# Patient Record
Sex: FEMALE | Race: WHITE | NOT HISPANIC OR LATINO | URBAN - METROPOLITAN AREA
[De-identification: names, ages, dates, MRNs, and addresses within clinical notes are randomized per-mention and may not be internally consistent; named-entity substitution may affect disease eponyms.]

---

## 2017-08-21 ENCOUNTER — INPATIENT (INPATIENT)
Facility: HOSPITAL | Age: 72
LOS: 0 days | Discharge: HOME | DRG: 343 | End: 2017-08-22
Attending: SURGERY | Admitting: SURGERY
Payer: COMMERCIAL

## 2017-08-21 VITALS
DIASTOLIC BLOOD PRESSURE: 76 MMHG | RESPIRATION RATE: 18 BRPM | OXYGEN SATURATION: 100 % | WEIGHT: 145.06 LBS | SYSTOLIC BLOOD PRESSURE: 157 MMHG | HEART RATE: 74 BPM | TEMPERATURE: 98 F

## 2017-08-21 DIAGNOSIS — E89.0 POSTPROCEDURAL HYPOTHYROIDISM: Chronic | ICD-10-CM

## 2017-08-21 DIAGNOSIS — Z90.49 ACQUIRED ABSENCE OF OTHER SPECIFIED PARTS OF DIGESTIVE TRACT: Chronic | ICD-10-CM

## 2017-08-21 LAB
ALBUMIN SERPL ELPH-MCNC: 4.8 G/DL — SIGNIFICANT CHANGE UP (ref 3.3–5)
ALP SERPL-CCNC: 102 U/L — SIGNIFICANT CHANGE UP (ref 40–120)
ALT FLD-CCNC: 26 U/L — SIGNIFICANT CHANGE UP (ref 10–45)
ANION GAP SERPL CALC-SCNC: 21 MMOL/L — HIGH (ref 5–17)
APPEARANCE UR: CLEAR — SIGNIFICANT CHANGE UP
AST SERPL-CCNC: 28 U/L — SIGNIFICANT CHANGE UP (ref 10–40)
BACTERIA # UR AUTO: PRESENT /HPF
BASOPHILS NFR BLD AUTO: 0.1 % — SIGNIFICANT CHANGE UP (ref 0–2)
BILIRUB SERPL-MCNC: 1.5 MG/DL — HIGH (ref 0.2–1.2)
BILIRUB UR-MCNC: NEGATIVE — SIGNIFICANT CHANGE UP
BUN SERPL-MCNC: 17 MG/DL — SIGNIFICANT CHANGE UP (ref 7–23)
CALCIUM SERPL-MCNC: 10 MG/DL — SIGNIFICANT CHANGE UP (ref 8.4–10.5)
CHLORIDE SERPL-SCNC: 97 MMOL/L — SIGNIFICANT CHANGE UP (ref 96–108)
CO2 SERPL-SCNC: 20 MMOL/L — LOW (ref 22–31)
COLOR SPEC: YELLOW — SIGNIFICANT CHANGE UP
CREAT SERPL-MCNC: 1 MG/DL — SIGNIFICANT CHANGE UP (ref 0.5–1.3)
DIFF PNL FLD: NEGATIVE — SIGNIFICANT CHANGE UP
EOSINOPHIL NFR BLD AUTO: 0.1 % — SIGNIFICANT CHANGE UP (ref 0–6)
EPI CELLS # UR: SIGNIFICANT CHANGE UP /HPF
GLUCOSE SERPL-MCNC: 119 MG/DL — HIGH (ref 70–99)
GLUCOSE UR QL: NEGATIVE — SIGNIFICANT CHANGE UP
HCT VFR BLD CALC: 42 % — SIGNIFICANT CHANGE UP (ref 34.5–45)
HGB BLD-MCNC: 14.8 G/DL — SIGNIFICANT CHANGE UP (ref 11.5–15.5)
HYALINE CASTS # UR AUTO: (no result) /LPF
KETONES UR-MCNC: 40 MG/DL
LACTATE SERPL-SCNC: 3.7 MMOL/L — HIGH (ref 0.5–2)
LACTATE SERPL-SCNC: 4.8 MMOL/L — CRITICAL HIGH (ref 0.5–2)
LEUKOCYTE ESTERASE UR-ACNC: NEGATIVE — SIGNIFICANT CHANGE UP
LIDOCAIN IGE QN: 30 U/L — SIGNIFICANT CHANGE UP (ref 7–60)
LYMPHOCYTES # BLD AUTO: 5.6 % — LOW (ref 13–44)
MCHC RBC-ENTMCNC: 31.1 PG — SIGNIFICANT CHANGE UP (ref 27–34)
MCHC RBC-ENTMCNC: 35.2 G/DL — SIGNIFICANT CHANGE UP (ref 32–36)
MCV RBC AUTO: 88.2 FL — SIGNIFICANT CHANGE UP (ref 80–100)
MONOCYTES NFR BLD AUTO: 5.8 % — SIGNIFICANT CHANGE UP (ref 2–14)
NEUTROPHILS NFR BLD AUTO: 88.4 % — HIGH (ref 43–77)
NITRITE UR-MCNC: NEGATIVE — SIGNIFICANT CHANGE UP
PH UR: 8.5 — HIGH (ref 5–8)
PLATELET # BLD AUTO: 215 K/UL — SIGNIFICANT CHANGE UP (ref 150–400)
POTASSIUM SERPL-MCNC: 4.2 MMOL/L — SIGNIFICANT CHANGE UP (ref 3.5–5.3)
POTASSIUM SERPL-SCNC: 4.2 MMOL/L — SIGNIFICANT CHANGE UP (ref 3.5–5.3)
PROT SERPL-MCNC: 8.5 G/DL — HIGH (ref 6–8.3)
PROT UR-MCNC: (no result) MG/DL
RBC # BLD: 4.76 M/UL — SIGNIFICANT CHANGE UP (ref 3.8–5.2)
RBC # FLD: 12.8 % — SIGNIFICANT CHANGE UP (ref 10.3–16.9)
RBC CASTS # UR COMP ASSIST: < 5 /HPF — SIGNIFICANT CHANGE UP
SODIUM SERPL-SCNC: 138 MMOL/L — SIGNIFICANT CHANGE UP (ref 135–145)
SP GR SPEC: 1.01 — SIGNIFICANT CHANGE UP (ref 1–1.03)
UROBILINOGEN FLD QL: 0.2 E.U./DL — SIGNIFICANT CHANGE UP
WBC # BLD: 14.4 K/UL — HIGH (ref 3.8–10.5)
WBC # FLD AUTO: 14.4 K/UL — HIGH (ref 3.8–10.5)
WBC UR QL: < 5 /HPF — SIGNIFICANT CHANGE UP

## 2017-08-21 PROCEDURE — 93010 ELECTROCARDIOGRAM REPORT: CPT | Mod: 77

## 2017-08-21 PROCEDURE — 99285 EMERGENCY DEPT VISIT HI MDM: CPT | Mod: 25

## 2017-08-21 PROCEDURE — 71275 CT ANGIOGRAPHY CHEST: CPT | Mod: 26

## 2017-08-21 PROCEDURE — 74010: CPT | Mod: 26

## 2017-08-21 PROCEDURE — 93010 ELECTROCARDIOGRAM REPORT: CPT

## 2017-08-21 PROCEDURE — 74020: CPT | Mod: 26

## 2017-08-21 PROCEDURE — 74174 CTA ABD&PLVS W/CONTRAST: CPT | Mod: 26

## 2017-08-21 RX ORDER — FAMOTIDINE 10 MG/ML
20 INJECTION INTRAVENOUS ONCE
Qty: 0 | Refills: 0 | Status: COMPLETED | OUTPATIENT
Start: 2017-08-21 | End: 2017-08-21

## 2017-08-21 RX ORDER — OXYCODONE AND ACETAMINOPHEN 5; 325 MG/1; MG/1
2 TABLET ORAL EVERY 4 HOURS
Qty: 0 | Refills: 0 | Status: DISCONTINUED | OUTPATIENT
Start: 2017-08-21 | End: 2017-08-22

## 2017-08-21 RX ORDER — SODIUM CHLORIDE 9 MG/ML
1000 INJECTION INTRAMUSCULAR; INTRAVENOUS; SUBCUTANEOUS
Qty: 0 | Refills: 0 | Status: DISCONTINUED | OUTPATIENT
Start: 2017-08-21 | End: 2017-08-21

## 2017-08-21 RX ORDER — LEVOTHYROXINE SODIUM 125 MCG
44 TABLET ORAL DAILY
Qty: 0 | Refills: 0 | Status: CANCELLED | OUTPATIENT
Start: 2017-08-22 | End: 2017-08-21

## 2017-08-21 RX ORDER — LEVOTHYROXINE SODIUM 125 MCG
44 TABLET ORAL DAILY
Qty: 0 | Refills: 0 | Status: DISCONTINUED | OUTPATIENT
Start: 2017-08-21 | End: 2017-08-21

## 2017-08-21 RX ORDER — LEVOTHYROXINE SODIUM 125 MCG
50 TABLET ORAL DAILY
Qty: 0 | Refills: 0 | Status: DISCONTINUED | OUTPATIENT
Start: 2017-08-21 | End: 2017-08-22

## 2017-08-21 RX ORDER — SODIUM CHLORIDE 9 MG/ML
1000 INJECTION INTRAMUSCULAR; INTRAVENOUS; SUBCUTANEOUS ONCE
Qty: 0 | Refills: 0 | Status: COMPLETED | OUTPATIENT
Start: 2017-08-21 | End: 2017-08-21

## 2017-08-21 RX ORDER — HYDROMORPHONE HYDROCHLORIDE 2 MG/ML
1 INJECTION INTRAMUSCULAR; INTRAVENOUS; SUBCUTANEOUS ONCE
Qty: 0 | Refills: 0 | Status: DISCONTINUED | OUTPATIENT
Start: 2017-08-21 | End: 2017-08-21

## 2017-08-21 RX ORDER — ONDANSETRON 8 MG/1
4 TABLET, FILM COATED ORAL ONCE
Qty: 0 | Refills: 0 | Status: COMPLETED | OUTPATIENT
Start: 2017-08-21 | End: 2017-08-21

## 2017-08-21 RX ORDER — AMLODIPINE BESYLATE 2.5 MG/1
5 TABLET ORAL DAILY
Qty: 0 | Refills: 0 | Status: DISCONTINUED | OUTPATIENT
Start: 2017-08-21 | End: 2017-08-22

## 2017-08-21 RX ORDER — METRONIDAZOLE 500 MG
500 TABLET ORAL ONCE
Qty: 0 | Refills: 0 | Status: COMPLETED | OUTPATIENT
Start: 2017-08-21 | End: 2017-08-21

## 2017-08-21 RX ORDER — HEPARIN SODIUM 5000 [USP'U]/ML
5000 INJECTION INTRAVENOUS; SUBCUTANEOUS EVERY 8 HOURS
Qty: 0 | Refills: 0 | Status: DISCONTINUED | OUTPATIENT
Start: 2017-08-21 | End: 2017-08-21

## 2017-08-21 RX ORDER — HEPARIN SODIUM 5000 [USP'U]/ML
5000 INJECTION INTRAVENOUS; SUBCUTANEOUS EVERY 12 HOURS
Qty: 0 | Refills: 0 | Status: DISCONTINUED | OUTPATIENT
Start: 2017-08-22 | End: 2017-08-22

## 2017-08-21 RX ORDER — METRONIDAZOLE 500 MG
500 TABLET ORAL EVERY 8 HOURS
Qty: 0 | Refills: 0 | Status: DISCONTINUED | OUTPATIENT
Start: 2017-08-21 | End: 2017-08-21

## 2017-08-21 RX ORDER — AMLODIPINE BESYLATE 2.5 MG/1
5 TABLET ORAL DAILY
Qty: 0 | Refills: 0 | Status: CANCELLED | OUTPATIENT
Start: 2017-08-22 | End: 2017-08-21

## 2017-08-21 RX ORDER — CIPROFLOXACIN LACTATE 400MG/40ML
400 VIAL (ML) INTRAVENOUS EVERY 12 HOURS
Qty: 0 | Refills: 0 | Status: DISCONTINUED | OUTPATIENT
Start: 2017-08-21 | End: 2017-08-21

## 2017-08-21 RX ORDER — ONDANSETRON 8 MG/1
4 TABLET, FILM COATED ORAL EVERY 6 HOURS
Qty: 0 | Refills: 0 | Status: DISCONTINUED | OUTPATIENT
Start: 2017-08-21 | End: 2017-08-21

## 2017-08-21 RX ORDER — SODIUM CHLORIDE 9 MG/ML
1000 INJECTION, SOLUTION INTRAVENOUS
Qty: 0 | Refills: 0 | Status: DISCONTINUED | OUTPATIENT
Start: 2017-08-21 | End: 2017-08-22

## 2017-08-21 RX ORDER — HEPARIN SODIUM 5000 [USP'U]/ML
5000 INJECTION INTRAVENOUS; SUBCUTANEOUS EVERY 12 HOURS
Qty: 0 | Refills: 0 | Status: DISCONTINUED | OUTPATIENT
Start: 2017-08-21 | End: 2017-08-21

## 2017-08-21 RX ORDER — HYDROMORPHONE HYDROCHLORIDE 2 MG/ML
1 INJECTION INTRAMUSCULAR; INTRAVENOUS; SUBCUTANEOUS EVERY 4 HOURS
Qty: 0 | Refills: 0 | Status: DISCONTINUED | OUTPATIENT
Start: 2017-08-21 | End: 2017-08-21

## 2017-08-21 RX ORDER — MORPHINE SULFATE 50 MG/1
4 CAPSULE, EXTENDED RELEASE ORAL ONCE
Qty: 0 | Refills: 0 | Status: DISCONTINUED | OUTPATIENT
Start: 2017-08-21 | End: 2017-08-21

## 2017-08-21 RX ORDER — HYDROMORPHONE HYDROCHLORIDE 2 MG/ML
0.5 INJECTION INTRAMUSCULAR; INTRAVENOUS; SUBCUTANEOUS EVERY 4 HOURS
Qty: 0 | Refills: 0 | Status: DISCONTINUED | OUTPATIENT
Start: 2017-08-21 | End: 2017-08-21

## 2017-08-21 RX ORDER — LEVOTHYROXINE SODIUM 125 MCG
176 TABLET ORAL DAILY
Qty: 0 | Refills: 0 | Status: DISCONTINUED | OUTPATIENT
Start: 2017-08-21 | End: 2017-08-21

## 2017-08-21 RX ORDER — ONDANSETRON 8 MG/1
4 TABLET, FILM COATED ORAL ONCE
Qty: 0 | Refills: 0 | Status: DISCONTINUED | OUTPATIENT
Start: 2017-08-21 | End: 2017-08-22

## 2017-08-21 RX ADMIN — ONDANSETRON 4 MILLIGRAM(S): 8 TABLET, FILM COATED ORAL at 18:04

## 2017-08-21 RX ADMIN — MORPHINE SULFATE 4 MILLIGRAM(S): 50 CAPSULE, EXTENDED RELEASE ORAL at 18:35

## 2017-08-21 RX ADMIN — HYDROMORPHONE HYDROCHLORIDE 1 MILLIGRAM(S): 2 INJECTION INTRAMUSCULAR; INTRAVENOUS; SUBCUTANEOUS at 19:28

## 2017-08-21 RX ADMIN — MORPHINE SULFATE 4 MILLIGRAM(S): 50 CAPSULE, EXTENDED RELEASE ORAL at 18:05

## 2017-08-21 RX ADMIN — SODIUM CHLORIDE 125 MILLILITER(S): 9 INJECTION INTRAMUSCULAR; INTRAVENOUS; SUBCUTANEOUS at 14:48

## 2017-08-21 RX ADMIN — ONDANSETRON 4 MILLIGRAM(S): 8 TABLET, FILM COATED ORAL at 15:17

## 2017-08-21 RX ADMIN — FAMOTIDINE 20 MILLIGRAM(S): 10 INJECTION INTRAVENOUS at 15:17

## 2017-08-21 RX ADMIN — SODIUM CHLORIDE 1000 MILLILITER(S): 9 INJECTION INTRAMUSCULAR; INTRAVENOUS; SUBCUTANEOUS at 18:35

## 2017-08-21 RX ADMIN — Medication 200 MILLIGRAM(S): at 17:43

## 2017-08-21 RX ADMIN — SODIUM CHLORIDE 1000 MILLILITER(S): 9 INJECTION INTRAMUSCULAR; INTRAVENOUS; SUBCUTANEOUS at 16:08

## 2017-08-21 RX ADMIN — HYDROMORPHONE HYDROCHLORIDE 1 MILLIGRAM(S): 2 INJECTION INTRAMUSCULAR; INTRAVENOUS; SUBCUTANEOUS at 19:27

## 2017-08-21 RX ADMIN — Medication 100 MILLIGRAM(S): at 16:08

## 2017-08-21 NOTE — H&P ADULT - NSHPPHYSICALEXAM_GEN_ALL_CORE
Vital Signs Last 24 Hrs  T(C): 36.7 (21 Aug 2017 18:08), Max: 36.9 (21 Aug 2017 13:38)  T(F): 98.1 (21 Aug 2017 18:08), Max: 98.4 (21 Aug 2017 13:38)  HR: 71 (21 Aug 2017 18:08) (60 - 74)  BP: 153/67 (21 Aug 2017 18:08) (153/67 - 188/79)  BP(mean): 50 (21 Aug 2017 15:54) (50 - 50)  RR: 18 (21 Aug 2017 18:08) (18 - 18)  SpO2: 100% (21 Aug 2017 18:08) (100% - 100%)    Gen: Age appropriate female in NAD  CV: RRR  Resp: CTAB  Abd: +BS, Soft, TTP RLQ, Neg Rosvig's sign, ND, No r/g/r  Ext: WWP, no peripheral edema

## 2017-08-21 NOTE — H&P ADULT - ASSESSMENT
71F Hx of HTN, HLD, Hypothyroid, Depression here with an acute appendicitis.  - Plan to admit to surgery, tele bed  - NPO/IVF  - IV cipro/flagyl (PCN allergy)  - Will take pt to OR for laparoscopic appendectomy  - SQH/SCDs/OOBA  - Pain/nausea control PRN  - Home amiloride, amlodipine, and synthroid will hold remaining home meds  - Plan discussed with Chief and Dr. Reed 71F Hx of HTN, HLD, Hypothyroid, Depression here with an acute appendicitis.  - Plan to admit to surgery, tele bed  - NPO/IVF  - IV cipro/flagyl (PCN allergy)  - Will take pt to OR for laparoscopic appendectomy  - SQH/SCDs/OOBA  - Pain/nausea control PRN  - Home amlodipine, and synthroid will hold remaining home meds  - Plan discussed with Chief and Dr. Reed

## 2017-08-21 NOTE — BRIEF OPERATIVE NOTE - OPERATION/FINDINGS
Retrocecal appendix, nonperforated, nongangrenous  Mesoappendix ligated with harmonic scalpel  Appendix ligated with endoloop x 2

## 2017-08-21 NOTE — H&P ADULT - NSHPLABSRESULTS_GEN_ALL_CORE
14.8   14.4  )-----------( 215      ( 21 Aug 2017 14:58 )             42.0   08-    138  |  97  |  17  ----------------------------<  119<H>  4.2   |  20<L>  |  1.00    Ca    10.0      21 Aug 2017 14:56    TPro  8.5<H>  /  Alb  4.8  /  TBili  1.5<H>  /  DBili  x   /  AST  28  /  ALT  26  /  AlkPhos  102      lactate: 4.8 --> 3.7    Urinalysis Basic - ( 21 Aug 2017 15:54 )    Color: Yellow / Appearance: Clear / S.010 / pH: x  Gluc: x / Ketone: 40 mg/dL  / Bili: NEGATIVE / Urobili: 0.2 E.U./dL   Blood: x / Protein: Trace mg/dL / Nitrite: NEGATIVE   Leuk Esterase: NEGATIVE / RBC: < 5 /HPF / WBC < 5 /HPF   Sq Epi: x / Non Sq Epi: Few /HPF / Bacteria: Present /HPF

## 2017-08-21 NOTE — H&P ADULT - ATTENDING COMMENTS
Pain since this morning.  Non-tender now  CT reviewed.    A/P: Acute appendicitis.   1. OR for lap/open appendectomy. Risks of bleeding, infection, organ injury reviewed. She agrees to proceed.

## 2017-08-21 NOTE — ED ADULT TRIAGE NOTE - CHIEF COMPLAINT QUOTE
abdominal pain and nausea since 8 AM normal BM today. patient is pale and lightheaded. denies fever, chills. went to [facility] and and was sent to ED

## 2017-08-21 NOTE — ED PROVIDER NOTE - MEDICAL DECISION MAKING DETAILS
abd pain elev  wbc and lactate  no fever initially  no acute ekg changes  c/o of increase pain in lower quadrants and lower abd   surgery called  will repeat ekg and trop  CTA  to eval for ischemia

## 2017-08-21 NOTE — ED PROVIDER NOTE - OBJECTIVE STATEMENT
72 yo female s/p lap choly 3 years ago  hx of HTN  nl stress test 1 year ago-- with upper abd pain nausea - no vomiting  x 6 hrs- sent from [facility] for eval-- no diarrhea nl stool this am  no fevers or chills no cough or sob or cp  no flank tenderness or dysuria- no prior

## 2017-08-21 NOTE — ED ADULT NURSE NOTE - OBJECTIVE STATEMENT
intermittent, nonradiating sharp lower abdominal pain since 8 am today.  Last BM this morning.  Denies nausea, blood in stool, vomiting, chest pain, recent abd sx,  symptoms

## 2017-08-21 NOTE — H&P ADULT - HISTORY OF PRESENT ILLNESS
71F with HTN, HLD, Hypothyroid, Depression, Anxiety presenting to the ED with a 1 day hx of periumbilical abdominal pain.... 71F with HTN, HLD, Hypothyroid, Depression, Anxiety presenting to the ED with a 1 day hx of periumbilical abdominal pain....    Colonscopy in september of 2016 that was reported as normal.    All: PCN and sulfa 71F with HTN, HLD, Hypothyroid, Depression, Anxiety presenting to the ED with a 1 day hx of periumbilical abdominal pain that was crampy in nature.  She denies any migration of her pain. She currently denies any F/C/N/V/D/Bloody BM/CP/SOB/Dyspnea/Dysuria. Colonscopy in september of 2016 that was reported as normal.    All: PCN and sulfa  PSH: laparoscopic cholecystectomy, subtotal thyroidectomy  Social: Never smoker

## 2017-08-21 NOTE — ED PROVIDER NOTE - ABDOMINAL EXAM
tender.../epigastric  no RLq tenderness   no CVAT tender.../epigastric  mild RLQ/ periumbilical tenderness--   no CVAT

## 2017-08-21 NOTE — ED ADULT NURSE NOTE - CHPI ED SYMPTOMS NEG
no hematuria/no chills/no vomiting/no burning urination/no dysuria/no nausea/no abdominal distension/no diarrhea/no fever/no blood in stool

## 2017-08-22 VITALS
TEMPERATURE: 99 F | HEART RATE: 66 BPM | DIASTOLIC BLOOD PRESSURE: 70 MMHG | RESPIRATION RATE: 16 BRPM | OXYGEN SATURATION: 96 % | SYSTOLIC BLOOD PRESSURE: 127 MMHG

## 2017-08-22 LAB
CULTURE RESULTS: SIGNIFICANT CHANGE UP
SPECIMEN SOURCE: SIGNIFICANT CHANGE UP

## 2017-08-22 PROCEDURE — 87086 URINE CULTURE/COLONY COUNT: CPT

## 2017-08-22 PROCEDURE — 81001 URINALYSIS AUTO W/SCOPE: CPT

## 2017-08-22 PROCEDURE — 80053 COMPREHEN METABOLIC PANEL: CPT

## 2017-08-22 PROCEDURE — 99285 EMERGENCY DEPT VISIT HI MDM: CPT | Mod: 25

## 2017-08-22 PROCEDURE — 85025 COMPLETE CBC W/AUTO DIFF WBC: CPT

## 2017-08-22 PROCEDURE — 84484 ASSAY OF TROPONIN QUANT: CPT

## 2017-08-22 PROCEDURE — 71275 CT ANGIOGRAPHY CHEST: CPT

## 2017-08-22 PROCEDURE — 74020: CPT

## 2017-08-22 PROCEDURE — 83605 ASSAY OF LACTIC ACID: CPT

## 2017-08-22 PROCEDURE — 83690 ASSAY OF LIPASE: CPT

## 2017-08-22 PROCEDURE — 96375 TX/PRO/DX INJ NEW DRUG ADDON: CPT | Mod: XU

## 2017-08-22 PROCEDURE — 96374 THER/PROPH/DIAG INJ IV PUSH: CPT | Mod: XU

## 2017-08-22 PROCEDURE — 36415 COLL VENOUS BLD VENIPUNCTURE: CPT

## 2017-08-22 PROCEDURE — 96376 TX/PRO/DX INJ SAME DRUG ADON: CPT

## 2017-08-22 PROCEDURE — 93005 ELECTROCARDIOGRAM TRACING: CPT | Mod: 77

## 2017-08-22 PROCEDURE — 88304 TISSUE EXAM BY PATHOLOGIST: CPT

## 2017-08-22 PROCEDURE — 74174 CTA ABD&PLVS W/CONTRAST: CPT

## 2017-08-22 RX ORDER — HEPARIN SODIUM 5000 [USP'U]/ML
5000 INJECTION INTRAVENOUS; SUBCUTANEOUS EVERY 8 HOURS
Qty: 0 | Refills: 0 | Status: DISCONTINUED | OUTPATIENT
Start: 2017-08-22 | End: 2017-08-22

## 2017-08-22 RX ORDER — DOCUSATE SODIUM 100 MG
1 CAPSULE ORAL
Qty: 14 | Refills: 0 | OUTPATIENT
Start: 2017-08-22 | End: 2017-08-29

## 2017-08-22 RX ORDER — LEVOTHYROXINE SODIUM 125 MCG
100 TABLET ORAL DAILY
Qty: 0 | Refills: 0 | Status: DISCONTINUED | OUTPATIENT
Start: 2017-08-22 | End: 2017-08-22

## 2017-08-22 RX ORDER — AMILORIDE HCL 5 MG
1 TABLET ORAL
Qty: 0 | Refills: 0 | COMMUNITY

## 2017-08-22 RX ORDER — ASPIRIN/CALCIUM CARB/MAGNESIUM 324 MG
0 TABLET ORAL
Qty: 0 | Refills: 0 | COMMUNITY

## 2017-08-22 RX ORDER — ZOLPIDEM TARTRATE 10 MG/1
1 TABLET ORAL
Qty: 0 | Refills: 0 | COMMUNITY

## 2017-08-22 RX ORDER — FLUOXETINE HCL 10 MG
1 CAPSULE ORAL
Qty: 0 | Refills: 0 | COMMUNITY

## 2017-08-22 RX ORDER — AMLODIPINE BESYLATE 2.5 MG/1
1 TABLET ORAL
Qty: 0 | Refills: 0 | COMMUNITY

## 2017-08-22 RX ORDER — OXYCODONE HYDROCHLORIDE 5 MG/1
1 TABLET ORAL
Qty: 20 | Refills: 0 | OUTPATIENT
Start: 2017-08-22

## 2017-08-22 RX ORDER — LEVOTHYROXINE SODIUM 125 MCG
1 TABLET ORAL
Qty: 0 | Refills: 0 | COMMUNITY

## 2017-08-22 RX ADMIN — Medication 100 MICROGRAM(S): at 05:24

## 2017-08-22 RX ADMIN — HEPARIN SODIUM 5000 UNIT(S): 5000 INJECTION INTRAVENOUS; SUBCUTANEOUS at 05:23

## 2017-08-22 RX ADMIN — AMLODIPINE BESYLATE 5 MILLIGRAM(S): 2.5 TABLET ORAL at 05:24

## 2017-08-22 NOTE — PROGRESS NOTE ADULT - SUBJECTIVE AND OBJECTIVE BOX
O/N: Admitted with acute appendicitis, OR for lap appy. O/N: Admitted with acute appendicitis, OR for lap appy.    INTERVAL HPI/OVERNIGHT EVENTS: POC WNL    STATUS POST: Laparoscopic appendectomy     POST OPERATIVE DAY #: 1    SUBJECTIVE: Patient seen and examined by chief resident. Px complains of pain on inhalation. Px denies chest pain, SOB, nausea, diarrhea, and vomiting.      MEDICATIONS  (STANDING):  ondansetron Injectable 4 milliGRAM(s) IV Push once  amLODIPine   Tablet 5 milliGRAM(s) Oral daily  heparin  Injectable 5000 Unit(s) SubCutaneous every 8 hours  levothyroxine 100 MICROGram(s) Oral daily    MEDICATIONS  (PRN):  oxyCODONE    5 mG/acetaminophen 325 mG 2 Tablet(s) Oral every 4 hours PRN Severe Pain (7 - 10)      Vital Signs Last 24 Hrs  T(C): 36.7 (22 Aug 2017 05:22), Max: 36.9 (21 Aug 2017 13:38)  T(F): 98.1 (22 Aug 2017 05:22), Max: 98.4 (21 Aug 2017 13:38)  HR: 81 (22 Aug 2017 05:22) (60 - 92)  BP: 154/84 (22 Aug 2017 05:22) (119/68 - 188/79)  BP(mean): 50 (21 Aug 2017 15:54) (50 - 50)  RR: 16 (22 Aug 2017 05:22) (8 - 18)  SpO2: 94% (22 Aug 2017 05:22) (94% - 100%)    PHYSICAL EXAM:    Constitutional: A&Ox3    Respiratory: CTAB, non labored breathing, no respiratory distress    Cardiovascular: NSR, RRR, no murmurs, rubs, or gallops.    Gastrointestinal: RLQ pain when palpated. Pain on inhalation. Soft, ND.                 Incision: Clean, dry, and intact.     Extremities:No cyanosis, clubbing, or edema                  I&O's Detail    21 Aug 2017 07:01  -  22 Aug 2017 07:00  --------------------------------------------------------  IN:    lactated ringers.: 600 mL    sodium chloride 0.9%: 100 mL  Total IN: 700 mL    OUT:    Voided: 400 mL  Total OUT: 400 mL    Total NET: 300 mL          LABS:                        14.8   14.4  )-----------( 215      ( 21 Aug 2017 14:58 )             42.0     08-21    138  |  97  |  17  ----------------------------<  119<H>  4.2   |  20<L>  |  1.00    Ca    10.0      21 Aug 2017 14:56    TPro  8.5<H>  /  Alb  4.8  /  TBili  1.5<H>  /  DBili  x   /  AST  28  /  ALT  26  /  AlkPhos  102  -      Urinalysis Basic - ( 21 Aug 2017 15:54 )    Color: Yellow / Appearance: Clear / S.010 / pH: x  Gluc: x / Ketone: 40 mg/dL  / Bili: NEGATIVE / Urobili: 0.2 E.U./dL   Blood: x / Protein: Trace mg/dL / Nitrite: NEGATIVE   Leuk Esterase: NEGATIVE / RBC: < 5 /HPF / WBC < 5 /HPF   Sq Epi: x / Non Sq Epi: Few /HPF / Bacteria: Present /HPF        RADIOLOGY & ADDITIONAL STUDIES: O/N: Admitted with acute appendicitis, OR for lap appy.    INTERVAL HPI/OVERNIGHT EVENTS: POC WNL    STATUS POST: Laparoscopic appendectomy     POST OPERATIVE DAY #: 1    SUBJECTIVE: Patient seen and examined by chief resident. Px complains of pain on inhalation. Px denies chest pain, SOB, nausea, diarrhea, and vomiting.      MEDICATIONS  (STANDING):  ondansetron Injectable 4 milliGRAM(s) IV Push once  amLODIPine   Tablet 5 milliGRAM(s) Oral daily  heparin  Injectable 5000 Unit(s) SubCutaneous every 8 hours  levothyroxine 100 MICROGram(s) Oral daily    MEDICATIONS  (PRN):  oxyCODONE    5 mG/acetaminophen 325 mG 2 Tablet(s) Oral every 4 hours PRN Severe Pain (7 - 10)      Vital Signs Last 24 Hrs  T(C): 36.7 (22 Aug 2017 05:22), Max: 36.9 (21 Aug 2017 13:38)  T(F): 98.1 (22 Aug 2017 05:22), Max: 98.4 (21 Aug 2017 13:38)  HR: 81 (22 Aug 2017 05:22) (60 - 92)  BP: 154/84 (22 Aug 2017 05:22) (119/68 - 188/79)  BP(mean): 50 (21 Aug 2017 15:54) (50 - 50)  RR: 16 (22 Aug 2017 05:22) (8 - 18)  SpO2: 94% (22 Aug 2017 05:22) (94% - 100%)    PHYSICAL EXAM:    Constitutional: A&Ox3    Respiratory: CTAB, non labored breathing, no respiratory distress    Cardiovascular: NSR, RRR, no murmurs, rubs, or gallops.    Gastrointestinal: TTP RLQ . Pain on inhalation. Soft, ND.                 Incision: Clean, dry, and intact.     Extremities:No cyanosis, clubbing, or edema                  I&O's Detail    21 Aug 2017 07:01  -  22 Aug 2017 07:00  --------------------------------------------------------  IN:    lactated ringers.: 600 mL    sodium chloride 0.9%: 100 mL  Total IN: 700 mL    OUT:    Voided: 400 mL  Total OUT: 400 mL    Total NET: 300 mL          LABS:                        14.8   14.4  )-----------( 215      ( 21 Aug 2017 14:58 )             42.0     08-21    138  |  97  |  17  ----------------------------<  119<H>  4.2   |  20<L>  |  1.00    Ca    10.0      21 Aug 2017 14:56    TPro  8.5<H>  /  Alb  4.8  /  TBili  1.5<H>  /  DBili  x   /  AST  28  /  ALT  26  /  AlkPhos  102  -      Urinalysis Basic - ( 21 Aug 2017 15:54 )    Color: Yellow / Appearance: Clear / S.010 / pH: x  Gluc: x / Ketone: 40 mg/dL  / Bili: NEGATIVE / Urobili: 0.2 E.U./dL   Blood: x / Protein: Trace mg/dL / Nitrite: NEGATIVE   Leuk Esterase: NEGATIVE / RBC: < 5 /HPF / WBC < 5 /HPF   Sq Epi: x / Non Sq Epi: Few /HPF / Bacteria: Present /HPF        RADIOLOGY & ADDITIONAL STUDIES: O/N: Admitted with acute appendicitis, OR for lap appy.    INTERVAL HPI/OVERNIGHT EVENTS: POC WNL    STATUS POST: Laparoscopic appendectomy     POST OPERATIVE DAY #: 1    SUBJECTIVE: Patient seen and examined by chief resident. Px complains of pain on inhalation. Px denies chest pain, SOB, nausea, diarrhea, and vomiting.      MEDICATIONS  (STANDING):  ondansetron Injectable 4 milliGRAM(s) IV Push once  amLODIPine   Tablet 5 milliGRAM(s) Oral daily  heparin  Injectable 5000 Unit(s) SubCutaneous every 8 hours  levothyroxine 100 MICROGram(s) Oral daily    MEDICATIONS  (PRN):  oxyCODONE    5 mG/acetaminophen 325 mG 2 Tablet(s) Oral every 4 hours PRN Severe Pain (7 - 10)      Vital Signs Last 24 Hrs  T(C): 36.7 (22 Aug 2017 05:22), Max: 36.9 (21 Aug 2017 13:38)  T(F): 98.1 (22 Aug 2017 05:22), Max: 98.4 (21 Aug 2017 13:38)  HR: 81 (22 Aug 2017 05:22) (60 - 92)  BP: 154/84 (22 Aug 2017 05:22) (119/68 - 188/79)  BP(mean): 50 (21 Aug 2017 15:54) (50 - 50)  RR: 16 (22 Aug 2017 05:22) (8 - 18)  SpO2: 94% (22 Aug 2017 05:22) (94% - 100%)    PHYSICAL EXAM:    Constitutional: A&Ox3    Respiratory: CTAB, non labored breathing, no respiratory distress    Cardiovascular: NSR, RRR, no murmurs, rubs, or gallops.    Gastrointestinal: TTP RLQ . Soft, ND.                 Incision: Clean, dry, and intact.     Extremities:No cyanosis, clubbing, or edema                  I&O's Detail    21 Aug 2017 07:01  -  22 Aug 2017 07:00  --------------------------------------------------------  IN:    lactated ringers.: 600 mL    sodium chloride 0.9%: 100 mL  Total IN: 700 mL    OUT:    Voided: 400 mL  Total OUT: 400 mL    Total NET: 300 mL          LABS:                        14.8   14.4  )-----------( 215      ( 21 Aug 2017 14:58 )             42.0     08-    138  |  97  |  17  ----------------------------<  119<H>  4.2   |  20<L>  |  1.00    Ca    10.0      21 Aug 2017 14:56    TPro  8.5<H>  /  Alb  4.8  /  TBili  1.5<H>  /  DBili  x   /  AST  28  /  ALT  26  /  AlkPhos  102        Urinalysis Basic - ( 21 Aug 2017 15:54 )    Color: Yellow / Appearance: Clear / S.010 / pH: x  Gluc: x / Ketone: 40 mg/dL  / Bili: NEGATIVE / Urobili: 0.2 E.U./dL   Blood: x / Protein: Trace mg/dL / Nitrite: NEGATIVE   Leuk Esterase: NEGATIVE / RBC: < 5 /HPF / WBC < 5 /HPF   Sq Epi: x / Non Sq Epi: Few /HPF / Bacteria: Present /HPF        RADIOLOGY & ADDITIONAL STUDIES:

## 2017-08-22 NOTE — DISCHARGE NOTE ADULT - MEDICATION SUMMARY - MEDICATIONS TO TAKE
I will START or STAY ON the medications listed below when I get home from the hospital:    acetaminophen-oxycodone 325 mg-5 mg oral tablet  -- 1 tab(s) by mouth every 4 hours, As Needed, Severe Pain (7 - 10) MDD:6  -- Indication: For pain    Colace sodium 100 mg oral capsule  -- 1 cap(s) by mouth 2 times a day  -- Medication should be taken with plenty of water.    -- Indication: For stool softener

## 2017-08-22 NOTE — DISCHARGE NOTE ADULT - HOSPITAL COURSE
71F with HTN, HLD, Hypothyroid, Depression, Anxiety presenting to the ED with a 1 day hx of periumbilical abdominal pain that was crampy in nature.  She denies any migration of her pain.  Colonscopy in september of 2016 that was reported as normal. Admission CT A/P revealed acute appendicitis. On 8/21, pt underwent a laparoscopic appendectomy that went well without complication. Diet was advanced as tolerated and pain was well controlled. At the time of discharge, vital signs are stable and labs all WNL.

## 2017-08-22 NOTE — DISCHARGE NOTE ADULT - PLAN OF CARE
pain control Continue a regular diet.  Do not engage in any strenuous physical activity for 4-6 weeks. Do not lift >20lbs.  You may shower, but should not bathe. Pat incision sites dry.  Follow up with Dr. Reed in 1-2 weeks. Call the office to schedule an appointment.  You will be discharged on oral pain medication, take as needed.  Return to the ED for any worsening abdominal pain, fever>101F/chills, nausea/vomiting, shortness of breath, or chest pain.

## 2017-08-22 NOTE — DISCHARGE NOTE ADULT - CARE PLAN
Principal Discharge DX:	Acute appendicitis, unspecified acute appendicitis type  Goal:	pain control  Instructions for follow-up, activity and diet:	Continue a regular diet.  Do not engage in any strenuous physical activity for 4-6 weeks. Do not lift >20lbs.  You may shower, but should not bathe. Pat incision sites dry.  Follow up with Dr. Reed in 1-2 weeks. Call the office to schedule an appointment.  You will be discharged on oral pain medication, take as needed.  Return to the ED for any worsening abdominal pain, fever>101F/chills, nausea/vomiting, shortness of breath, or chest pain.

## 2017-08-22 NOTE — PROGRESS NOTE ADULT - ASSESSMENT
Assessment:71y Female s/p laparoscopic appendectomy    Plan:  Pain/nausea control PRN  Home meds  Incentive spirometer/OOB/Ambulate  IVF, Diet: CLD  AM labs

## 2017-08-22 NOTE — PROGRESS NOTE ADULT - ASSESSMENT
- Plan to admit to surgery, regional bed  - CLD/IVF  - SQH/SCDs/OOBA  - Pain/nausea control PRN  - Home amlodipine, and synthroid will hold remaining home meds 71F Hx of HTN, HLD, hypothyroid, and depression presents with diffuse abdominal tendernous and guarding. Determined to have acute appendicitis. S/P laparoscopic appendectomy. RLQ pain and pain on deep inhalation present. WBC of 14.4, TBili of 1.5. Last used incentive spirometer last night.     - Encourage Incentive Spirometer use  - CLD/IVF  - SQH/SCDs/OOBA  - Pain/nausea control PRN  - Home amlodipine, and synthroid will hold remaining home meds

## 2017-08-22 NOTE — DISCHARGE NOTE ADULT - CARE PROVIDER_API CALL
Dawood Reed), ColonRectal Surgery; Surgery  [address] [place] [address]  [address] [place] [address]  Phone: [phone]  Fax: [phone]

## 2017-08-22 NOTE — DISCHARGE NOTE ADULT - PATIENT PORTAL LINK FT
“You can access the [employer] Patient Portal, offered by [facility], by registering with the following website: [email or web address]”

## 2017-08-22 NOTE — PROGRESS NOTE ADULT - SUBJECTIVE AND OBJECTIVE BOX
Team 4 Surgery Post-Op Note, PCN:     Pre-Op Dx: Acute appendicitis  Procedure: Laparoscopic appendectomy    Surgeon: Derek    Subjective: Patient resting comfortably. feels some soreness around incisions.     Vital Signs Last 24 Hrs  T(C): 36.5 (22 Aug 2017 01:15), Max: 36.9 (21 Aug 2017 13:38)  T(F): 97.7 (22 Aug 2017 01:15), Max: 98.4 (21 Aug 2017 13:38)  HR: 91 (22 Aug 2017 01:15) (60 - 92)  BP: 145/80 (22 Aug 2017 01:15) (119/68 - 188/79)  BP(mean): 50 (21 Aug 2017 15:54) (50 - 50)  RR: 18 (22 Aug 2017 01:15) (8 - 18)  SpO2: 94% (22 Aug 2017 01:15) (94% - 100%)    Physical Exam:  General: NAD, resting comfortably in bed  Pulmonary: Nonlabored breathing, no respiratory distress  Cardiovascular: NSR  Abdominal: soft, NT/ND, incisions intact, clean/dry  Extremities: WWP, normal strength  Neuro: A/O x 3, CNs II-XII grossly intact, no focal deficits, normal motor/sensation  Pulses: palpable distal pulses      LABS:                        14.8   14.4  )-----------( 215      ( 21 Aug 2017 14:58 )             42.0     08-21    138  |  97  |  17  ----------------------------<  119<H>  4.2   |  20<L>  |  1.00    Ca    10.0      21 Aug 2017 14:56    TPro  8.5<H>  /  Alb  4.8  /  TBili  1.5<H>  /  DBili  x   /  AST  28  /  ALT  26  /  AlkPhos  102  08-21      CAPILLARY BLOOD GLUCOSE        Urinalysis Basic - ( 21 Aug 2017 15:54 )    Color: Yellow / Appearance: Clear / S.010 / pH: x  Gluc: x / Ketone: 40 mg/dL  / Bili: NEGATIVE / Urobili: 0.2 E.U./dL   Blood: x / Protein: Trace mg/dL / Nitrite: NEGATIVE   Leuk Esterase: NEGATIVE / RBC: < 5 /HPF / WBC < 5 /HPF   Sq Epi: x / Non Sq Epi: Few /HPF / Bacteria: Present /HPF      LIVER FUNCTIONS - ( 21 Aug 2017 14:56 )  Alb: 4.8 g/dL / Pro: 8.5 g/dL / ALK PHOS: 102 U/L / ALT: 26 U/L / AST: 28 U/L / GGT: x

## 2017-08-23 LAB — SURGICAL PATHOLOGY STUDY: SIGNIFICANT CHANGE UP

## 2017-08-25 DIAGNOSIS — K35.80 UNSPECIFIED ACUTE APPENDICITIS: ICD-10-CM

## 2017-08-25 DIAGNOSIS — I10 ESSENTIAL (PRIMARY) HYPERTENSION: ICD-10-CM

## 2017-08-25 DIAGNOSIS — Z88.0 ALLERGY STATUS TO PENICILLIN: ICD-10-CM

## 2017-08-25 DIAGNOSIS — E89.0 POSTPROCEDURAL HYPOTHYROIDISM: ICD-10-CM

## 2017-08-25 DIAGNOSIS — Z90.49 ACQUIRED ABSENCE OF OTHER SPECIFIED PARTS OF DIGESTIVE TRACT: ICD-10-CM

## 2017-08-25 DIAGNOSIS — F32.9 MAJOR DEPRESSIVE DISORDER, SINGLE EPISODE, UNSPECIFIED: ICD-10-CM

## 2017-08-25 DIAGNOSIS — F41.9 ANXIETY DISORDER, UNSPECIFIED: ICD-10-CM

## 2017-08-25 DIAGNOSIS — Z88.2 ALLERGY STATUS TO SULFONAMIDES: ICD-10-CM

## 2017-08-25 DIAGNOSIS — E78.5 HYPERLIPIDEMIA, UNSPECIFIED: ICD-10-CM

## 2020-07-23 NOTE — PRE-OP CHECKLIST - DENTURES
Anesthesia Pre Eval Note    Anesthesia ROS/Med Hx        Anesthetic Complication History:  Patient does not have a history of anesthetic complications      Pulmonary Review:    Positive for sleep apnea   Positive for COPD     Neuro/Psych Review:    Positive for psychiatric history    Cardiovascular Review:    Positive for hypertension    GI/HEPATIC/RENAL Review:  Patient does not have a GI/hepatic/renalhistory       End/Other Review:  Patient does not have an endo/other history        Relevant Problems   No relevant active problems       Physical Exam     Airway   Mallampati: II  TM Distance: >3 FB  Neck ROM: Full  Neck: Non-tender and Able to place in sniff position  TMJ Mobility: Good    Cardiovascular  Cardiovascular exam normal  Cardio Rhythm: Regular  Cardio Rate: Normal    Head Assessment  Head assessment: Normocephalic and Atraumatic    General Assessment  General Assessment: Alert and oriented and No acute distress    Dental Exam  Dental exam normal    Pulmonary Exam  Pulmonary exam normal  Breath sounds clear to auscultation:  Yes    Abdominal Exam  Abdominal exam normal      Anesthesia Plan    ASA Status: 3    Anesthesia Type: MAC    Induction: Intravenous  Maintenance: TIVA      Risks Discussed: Intra-operative Awareness    Post-op Pain Management: Per Surgeon      Checklist  Reviewed: NPO Status, Allergies, Medications, Problem list, Past Med History and Patient Summary    Informed Consent  The proposed anesthetic plan, including its risks and benefits, have been discussed with the Patient  - along with the risks and benefits of alternatives.  Questions were encouraged and answered and the patient and/or representative understands and agrees to proceed.     Blood Products: Not Anticipated    Comments  Plan Comments: R/B of MAC and GA discussed with patient. Discussion included, but was not limited to, intraoperative awareness and airway obstruction for MAC, and intubation, aspiration, sore throat,  postoperative pain and nausea for GA. Also discussed potential for serious perioperative complications such as hypoxemia, hypotension, and cerebrovascular or myocardial ischemia. Discussed that any and all measures necessary for care and safety of patient will be undertaken as needed. Pt voiced understanding of discussion as laid out. All questions answered. Will proceed with MAC anesthetic.       no

## 2023-12-23 NOTE — PATIENT PROFILE ADULT. - HAS THE PATIENT HAD A SIGNIFICANT CHANGE IN FUNCTIONAL STATUS DUE TO CVA, HEAD TRAUMA, ORTHOPEDIC TRAUMA/SURGERY, OR FALL, WITH THE WEEK PRIOR TO ADMISSION
no Dr. Carias: I performed a face to face bedside interview with patient regarding history of present illness, review of symptoms and past medical history. I completed an independent physical exam.  I have discussed patient's plan of care with PA.   I agree with note as stated above, having amended the EMR as needed to reflect my findings.   This includes HISTORY OF PRESENT ILLNESS, HIV, PAST MEDICAL/SURGICAL/FAMILY/SOCIAL HISTORY, ALLERGIES AND HOME MEDICATIONS, REVIEW OF SYSTEMS, PHYSICAL EXAM, and any PROGRESS NOTES during the time I functioned as the attending physician for this patient.  BRYANNA Carias DO